# Patient Record
Sex: FEMALE
[De-identification: names, ages, dates, MRNs, and addresses within clinical notes are randomized per-mention and may not be internally consistent; named-entity substitution may affect disease eponyms.]

---

## 2020-07-19 ENCOUNTER — NURSE TRIAGE (OUTPATIENT)
Dept: OTHER | Facility: CLINIC | Age: 75
End: 2020-07-19

## 2020-07-20 NOTE — TELEPHONE ENCOUNTER
Reason for Disposition   Caller has medication question only, adult not sick, and triager answers question    Answer Assessment - Initial Assessment Questions  1. SYMPTOMS: \"Do you have any symptoms? \"      No  2. SEVERITY: If symptoms are present, ask \"Are they mild, moderate or severe? \"      No    Caller stated she wanted to know if she should take her Lantus 34 units with her blood sugar of 177. Caller stated she took the Lantus while on the call and stated she would eat with it as she was educated in the hospital. [employer] was informed to keep a log of her blood sugar and the times and when the medications are taken and to call back if her blood sugar is low or high or feeling symptomatic and agreed.     Protocols used: MEDICATION QUESTION CALL-ADULT-